# Patient Record
Sex: MALE | Race: WHITE | Employment: OTHER | URBAN - NONMETROPOLITAN AREA
[De-identification: names, ages, dates, MRNs, and addresses within clinical notes are randomized per-mention and may not be internally consistent; named-entity substitution may affect disease eponyms.]

---

## 2017-03-23 LAB
BUN BLDV-MCNC: 19 MG/DL
CALCIUM SERPL-MCNC: 9.4 MG/DL
CHLORIDE BLD-SCNC: 105 MMOL/L
CO2: 30 MMOL/L
CREAT SERPL-MCNC: 1.5 MG/DL
GFR CALCULATED: 48
GLUCOSE BLD-MCNC: 189 MG/DL
HBA1C MFR BLD: 9.9 %
PHOSPHORUS: 3.7 MG/DL
POTASSIUM SERPL-SCNC: 4.8 MMOL/L
PTH INTACT: 89.2
SODIUM BLD-SCNC: 142 MMOL/L

## 2017-03-24 LAB
VITAMIN D 25-HYDROXY: 20.8
VITAMIN D2, 25 HYDROXY: NORMAL
VITAMIN D3,25 HYDROXY: NORMAL

## 2017-04-07 ENCOUNTER — OFFICE VISIT (OUTPATIENT)
Dept: NEPHROLOGY | Age: 77
End: 2017-04-07

## 2017-04-07 VITALS
HEART RATE: 68 BPM | DIASTOLIC BLOOD PRESSURE: 76 MMHG | RESPIRATION RATE: 18 BRPM | SYSTOLIC BLOOD PRESSURE: 110 MMHG | BODY MASS INDEX: 27.98 KG/M2 | WEIGHT: 195 LBS

## 2017-04-07 DIAGNOSIS — I10 ESSENTIAL HYPERTENSION: ICD-10-CM

## 2017-04-07 DIAGNOSIS — N18.30 CKD (CHRONIC KIDNEY DISEASE), STAGE III (HCC): Primary | ICD-10-CM

## 2017-04-07 DIAGNOSIS — N18.3 DIABETES MELLITUS DUE TO UNDERLYING CONDITION WITH STAGE 3 CHRONIC KIDNEY DISEASE, UNSPECIFIED LONG TERM INSULIN USE STATUS: ICD-10-CM

## 2017-04-07 DIAGNOSIS — E55.9 VITAMIN D DEFICIENCY: ICD-10-CM

## 2017-04-07 DIAGNOSIS — N20.0 NEPHROLITHIASIS: ICD-10-CM

## 2017-04-07 DIAGNOSIS — E08.22 DIABETES MELLITUS DUE TO UNDERLYING CONDITION WITH STAGE 3 CHRONIC KIDNEY DISEASE, UNSPECIFIED LONG TERM INSULIN USE STATUS: ICD-10-CM

## 2017-04-07 PROCEDURE — 99214 OFFICE O/P EST MOD 30 MIN: CPT | Performed by: INTERNAL MEDICINE

## 2017-09-26 DIAGNOSIS — N20.0 NEPHROLITHIASIS: ICD-10-CM

## 2017-10-06 LAB
BUN BLDV-MCNC: 24 MG/DL
CALCIUM SERPL-MCNC: 8.8 MG/DL
CHLORIDE BLD-SCNC: 104 MMOL/L
CO2: 31 MMOL/L
CREAT SERPL-MCNC: 1.7 MG/DL
GFR CALCULATED: 42
GLUCOSE BLD-MCNC: 152 MG/DL
PHOSPHORUS: 4 MG/DL
POTASSIUM SERPL-SCNC: 4.6 MMOL/L
PTH INTACT: 91.4
SODIUM BLD-SCNC: 139 MMOL/L

## 2017-10-13 ENCOUNTER — OFFICE VISIT (OUTPATIENT)
Dept: NEPHROLOGY | Age: 77
End: 2017-10-13
Payer: MEDICARE

## 2017-10-13 VITALS
SYSTOLIC BLOOD PRESSURE: 108 MMHG | RESPIRATION RATE: 18 BRPM | DIASTOLIC BLOOD PRESSURE: 70 MMHG | HEART RATE: 68 BPM | WEIGHT: 195 LBS | BODY MASS INDEX: 27.98 KG/M2

## 2017-10-13 DIAGNOSIS — I10 ESSENTIAL HYPERTENSION: ICD-10-CM

## 2017-10-13 DIAGNOSIS — E55.9 VITAMIN D DEFICIENCY: ICD-10-CM

## 2017-10-13 DIAGNOSIS — N25.81 HYPERPARATHYROIDISM, SECONDARY RENAL (HCC): ICD-10-CM

## 2017-10-13 DIAGNOSIS — N20.0 NEPHROLITHIASIS: ICD-10-CM

## 2017-10-13 DIAGNOSIS — E08.22 DIABETES MELLITUS DUE TO UNDERLYING CONDITION WITH STAGE 3 CHRONIC KIDNEY DISEASE, UNSPECIFIED LONG TERM INSULIN USE STATUS: ICD-10-CM

## 2017-10-13 DIAGNOSIS — N18.3 DIABETES MELLITUS DUE TO UNDERLYING CONDITION WITH STAGE 3 CHRONIC KIDNEY DISEASE, UNSPECIFIED LONG TERM INSULIN USE STATUS: ICD-10-CM

## 2017-10-13 DIAGNOSIS — N18.30 CKD (CHRONIC KIDNEY DISEASE), STAGE III (HCC): Primary | ICD-10-CM

## 2017-10-13 PROCEDURE — 99214 OFFICE O/P EST MOD 30 MIN: CPT | Performed by: INTERNAL MEDICINE

## 2017-10-13 NOTE — PROGRESS NOTES
negative  Musculoskeletal:positive for arthralgias and stiff joints  Neurological: negative  Behavioral/Psych: negative  Endocrine: negative  Allergic/Immunologic: negative  Medications:     Current Outpatient Prescriptions   Medication Sig Dispense Refill    insulin aspart (NOVOLOG FLEXPEN) 100 UNIT/ML injection pen Inject 18 Units into the skin 2 times daily.  LORazepam (ATIVAN) 0.5 MG tablet Take 1 mg by mouth daily.  insulin glargine (LANTUS) 100 UNIT/ML injection Inject 22 Units into the skin nightly.  atorvastatin (LIPITOR) 20 MG tablet Take 20 mg by mouth daily.  atenolol (TENORMIN) 25 MG tablet Take 25 mg by mouth daily.  aspirin 81 MG tablet Take 81 mg by mouth daily.  omeprazole (PRILOSEC) 10 MG capsule Take 20 mg by mouth as needed. No current facility-administered medications for this visit.         Lab Results:    CBC:   Lab Results   Component Value Date    HGB 14.3 12/15/2014    HCT 44.6 12/15/2014     BMP:    Lab Results   Component Value Date     10/06/2017     03/23/2017     09/01/2016    K 4.6 10/06/2017    K 4.8 03/23/2017    K 4.8 09/01/2016     10/06/2017     03/23/2017     09/01/2016    CO2 31 10/06/2017    CO2 30 03/23/2017    CO2 29 09/01/2016    BUN 24 10/06/2017    BUN 19 03/23/2017    BUN 21 09/01/2016    CREATININE 1.7 10/06/2017    CREATININE 1.5 03/23/2017    CREATININE 1.8 09/01/2016    GLUCOSE 152 10/06/2017    GLUCOSE 189 03/23/2017    GLUCOSE 183 09/01/2016      Hepatic: No results found for: AST, ALT, ALB, BILITOT, ALKPHOS  BNP: No results found for: BNP  Lipids:   Lab Results   Component Value Date    CHOL 115 01/25/2016    HDL 36 01/25/2016     INR: No results found for: INR  URINE: No results found for: Veronia Payamllon  No results found for: Helyn Agent, [address] [facility], LABCAST, [address], RBCUA, MUCUS, TRICHOMONAS, YEAST, BACTERIA, CLARITYU, SPECGRAV, LEUKOCYTESUR, [address], BILIRUBINUR, BLOODU, Shen Maupin,

## 2017-11-17 ENCOUNTER — TELEPHONE (OUTPATIENT)
Dept: NEPHROLOGY | Age: 77
End: 2017-11-17

## 2017-11-17 NOTE — TELEPHONE ENCOUNTER
LM on VM to call office. I do not see Potassium listed as a current medication. Patient to call office to clarify.

## 2018-10-12 ENCOUNTER — OFFICE VISIT (OUTPATIENT)
Dept: NEPHROLOGY | Age: 78
End: 2018-10-12
Payer: MEDICARE

## 2018-10-12 VITALS
SYSTOLIC BLOOD PRESSURE: 104 MMHG | WEIGHT: 187 LBS | BODY MASS INDEX: 26.83 KG/M2 | DIASTOLIC BLOOD PRESSURE: 70 MMHG | HEART RATE: 68 BPM

## 2018-10-12 DIAGNOSIS — I10 ESSENTIAL HYPERTENSION: ICD-10-CM

## 2018-10-12 DIAGNOSIS — N18.30 CKD (CHRONIC KIDNEY DISEASE), STAGE III (HCC): Primary | ICD-10-CM

## 2018-10-12 DIAGNOSIS — N25.81 HYPERPARATHYROIDISM, SECONDARY RENAL (HCC): ICD-10-CM

## 2018-10-12 DIAGNOSIS — E08.22 DIABETES MELLITUS DUE TO UNDERLYING CONDITION WITH STAGE 3 CHRONIC KIDNEY DISEASE, UNSPECIFIED WHETHER LONG TERM INSULIN USE: ICD-10-CM

## 2018-10-12 DIAGNOSIS — N20.0 NEPHROLITHIASIS: ICD-10-CM

## 2018-10-12 DIAGNOSIS — N18.3 DIABETES MELLITUS DUE TO UNDERLYING CONDITION WITH STAGE 3 CHRONIC KIDNEY DISEASE, UNSPECIFIED WHETHER LONG TERM INSULIN USE: ICD-10-CM

## 2018-10-12 LAB
BUN BLDV-MCNC: 26 MG/DL
CALCIUM SERPL-MCNC: 8.9 MG/DL
CHLORIDE BLD-SCNC: 104 MMOL/L
CO2: 29 MMOL/L
CREAT SERPL-MCNC: 1.9 MG/DL
GFR CALCULATED: 37
GLUCOSE BLD-MCNC: 161 MG/DL
PHOSPHORUS: 4.1 MG/DL
POTASSIUM SERPL-SCNC: 4.6 MMOL/L
PTH INTACT: 80.9
SODIUM BLD-SCNC: 141 MMOL/L
VITAMIN D 25-HYDROXY: 43.5
VITAMIN D2, 25 HYDROXY: NORMAL
VITAMIN D3,25 HYDROXY: NORMAL

## 2018-10-12 PROCEDURE — 99213 OFFICE O/P EST LOW 20 MIN: CPT | Performed by: INTERNAL MEDICINE

## 2018-10-12 RX ORDER — POTASSIUM CITRATE 10 MEQ/1
20 TABLET, EXTENDED RELEASE ORAL 2 TIMES DAILY
COMMUNITY
End: 2019-10-11 | Stop reason: ALTCHOICE

## 2018-10-12 NOTE — PROGRESS NOTES
Medication Sig Dispense Refill    ticagrelor (BRILINTA) 90 MG TABS tablet Take 90 mg by mouth 2 times daily      potassium citrate (UROCIT-K) 10 MEQ (1080 MG) extended release tablet Take 20 mEq by mouth 2 times daily      insulin aspart (NOVOLOG FLEXPEN) 100 UNIT/ML injection pen Inject 18 Units into the skin 2 times daily.  LORazepam (ATIVAN) 0.5 MG tablet Take 1 mg by mouth daily.  insulin glargine (LANTUS) 100 UNIT/ML injection Inject 22 Units into the skin nightly.  atorvastatin (LIPITOR) 20 MG tablet Take 20 mg by mouth daily.  atenolol (TENORMIN) 25 MG tablet Take 25 mg by mouth daily.  aspirin 81 MG tablet Take 81 mg by mouth daily.  omeprazole (PRILOSEC) 10 MG capsule Take 20 mg by mouth as needed. No current facility-administered medications for this visit.         Lab Results:    CBC:   Lab Results   Component Value Date    HGB 14.3 12/15/2014    HCT 44.6 12/15/2014     BMP:    Lab Results   Component Value Date     10/06/2017     03/23/2017     09/01/2016    K 4.6 10/06/2017    K 4.8 03/23/2017    K 4.8 09/01/2016     10/06/2017     03/23/2017     09/01/2016    CO2 31 10/06/2017    CO2 30 03/23/2017    CO2 29 09/01/2016    BUN 24 10/06/2017    BUN 19 03/23/2017    BUN 21 09/01/2016    CREATININE 1.7 10/06/2017    CREATININE 1.5 03/23/2017    CREATININE 1.8 09/01/2016    GLUCOSE 152 10/06/2017    GLUCOSE 189 03/23/2017    GLUCOSE 183 09/01/2016      Hepatic: No results found for: AST, ALT, ALB, BILITOT, ALKPHOS  BNP: No results found for: BNP  Lipids:   Lab Results   Component Value Date    CHOL 115 01/25/2016    HDL 36 01/25/2016     INR: No results found for: INR  URINE: No results found for: NAUR, PROTUR  No results found for: Faustina Madura, PHUR, LABCAST, WBCUA, RBCUA, MUCUS, TRICHOMONAS, YEAST, BACTERIA, CLARITYU, SPECGRAV, LEUKOCYTESUR, UROBILINOGEN, BILIRUBINUR, BLOODU, GLUCOSEU, KETUA, AMORPHOUS   Microalbumen/Creatinine

## 2019-09-12 LAB
VITAMIN D 25-HYDROXY: 39.9
VITAMIN D2, 25 HYDROXY: NORMAL
VITAMIN D3,25 HYDROXY: NORMAL

## 2019-10-05 LAB
ALBUMIN SERPL-MCNC: 3.7 G/DL
ALP BLD-CCNC: 92 U/L
ALT SERPL-CCNC: 24 U/L
ANION GAP SERPL CALCULATED.3IONS-SCNC: NORMAL MMOL/L
AST SERPL-CCNC: 24 U/L
BILIRUB SERPL-MCNC: 0.7 MG/DL (ref 0.1–1.4)
BUN BLDV-MCNC: 17 MG/DL
CALCIUM SERPL-MCNC: 8.8 MG/DL
CHLORIDE BLD-SCNC: 104 MMOL/L
CHOLESTEROL, TOTAL: 104 MG/DL
CHOLESTEROL/HDL RATIO: ABNORMAL
CO2: 31 MMOL/L
CREAT SERPL-MCNC: 1.8 MG/DL
GFR CALCULATED: 39
GLUCOSE BLD-MCNC: 188 MG/DL
HDLC SERPL-MCNC: 32 MG/DL (ref 35–70)
LDL CHOLESTEROL CALCULATED: 58 MG/DL (ref 0–160)
MAGNESIUM: 1.7 MG/DL
POTASSIUM SERPL-SCNC: 4.1 MMOL/L
SODIUM BLD-SCNC: 139 MMOL/L
TOTAL PROTEIN: 7.1
TRIGL SERPL-MCNC: 72 MG/DL
VLDLC SERPL CALC-MCNC: 14 MG/DL

## 2019-10-09 LAB
PHOSPHORUS: 4.1 MG/DL
PTH INTACT: 55.9

## 2019-10-11 ENCOUNTER — OFFICE VISIT (OUTPATIENT)
Dept: NEPHROLOGY | Age: 79
End: 2019-10-11
Payer: MEDICARE

## 2019-10-11 VITALS
HEART RATE: 69 BPM | OXYGEN SATURATION: 97 % | SYSTOLIC BLOOD PRESSURE: 118 MMHG | WEIGHT: 184 LBS | BODY MASS INDEX: 26.4 KG/M2 | DIASTOLIC BLOOD PRESSURE: 68 MMHG

## 2019-10-11 DIAGNOSIS — N18.3 DIABETES MELLITUS DUE TO UNDERLYING CONDITION WITH STAGE 3 CHRONIC KIDNEY DISEASE, UNSPECIFIED WHETHER LONG TERM INSULIN USE: ICD-10-CM

## 2019-10-11 DIAGNOSIS — I10 ESSENTIAL HYPERTENSION: ICD-10-CM

## 2019-10-11 DIAGNOSIS — N25.81 HYPERPARATHYROIDISM, SECONDARY RENAL (HCC): ICD-10-CM

## 2019-10-11 DIAGNOSIS — E08.22 DIABETES MELLITUS DUE TO UNDERLYING CONDITION WITH STAGE 3 CHRONIC KIDNEY DISEASE, UNSPECIFIED WHETHER LONG TERM INSULIN USE: ICD-10-CM

## 2019-10-11 DIAGNOSIS — N20.0 NEPHROLITHIASIS: ICD-10-CM

## 2019-10-11 DIAGNOSIS — N18.30 CKD (CHRONIC KIDNEY DISEASE), STAGE III (HCC): Primary | ICD-10-CM

## 2019-10-11 PROCEDURE — 99214 OFFICE O/P EST MOD 30 MIN: CPT | Performed by: INTERNAL MEDICINE

## 2019-10-11 RX ORDER — CLOPIDOGREL BISULFATE 75 MG/1
TABLET ORAL
Refills: 6 | COMMUNITY
Start: 2019-09-12

## 2020-10-06 LAB
BUN BLDV-MCNC: 26 MG/DL
CALCIUM SERPL-MCNC: 8.7 MG/DL
CHLORIDE BLD-SCNC: 102 MMOL/L
CO2: 27 MMOL/L
CREAT SERPL-MCNC: 1.8 MG/DL
GFR CALCULATED: 39
GLUCOSE BLD-MCNC: 203 MG/DL
POTASSIUM SERPL-SCNC: 4 MMOL/L
PTH INTACT: 54.1
SODIUM BLD-SCNC: 135 MMOL/L
VITAMIN D 25-HYDROXY: 37.5
VITAMIN D2, 25 HYDROXY: NORMAL
VITAMIN D3,25 HYDROXY: NORMAL

## 2020-10-16 ENCOUNTER — OFFICE VISIT (OUTPATIENT)
Dept: NEPHROLOGY | Age: 80
End: 2020-10-16
Payer: MEDICARE

## 2020-10-16 VITALS
BODY MASS INDEX: 27.84 KG/M2 | OXYGEN SATURATION: 100 % | HEART RATE: 65 BPM | DIASTOLIC BLOOD PRESSURE: 65 MMHG | SYSTOLIC BLOOD PRESSURE: 136 MMHG | WEIGHT: 194 LBS

## 2020-10-16 PROCEDURE — 99213 OFFICE O/P EST LOW 20 MIN: CPT | Performed by: INTERNAL MEDICINE

## 2020-10-16 RX ORDER — INSULIN LISPRO 100 [IU]/ML
INJECTION, SOLUTION INTRAVENOUS; SUBCUTANEOUS
COMMUNITY
Start: 2020-09-26

## 2020-10-16 NOTE — PROGRESS NOTES
Renal Progress Note    Assessment and Plan:      Diagnosis Orders   1. Stage 3b chronic kidney disease     2. Nephrolithiasis     3. Essential hypertension     4. Hyperparathyroidism, secondary renal ([facility] [address]     PLAN:   I discussed my thoughts with the patient. He understood. I addressed this question. Lab result discussed with him. Litholink report discussed with him as well. Indeed we went through them together in epic. Serum creatinine is stable. [address] [place] [address] [address] is mostly fine except for very low urine output. I discussed  increasing fluid intake with him. Return visit in 12 months. Patient Active Problem List   Diagnosis    CKD (chronic kidney disease), stage III    DM (diabetes mellitus) ([address]    HTN (hypertension)    HLD (hyperlipidemia)    History of duodenal ulcer    Change in bowel habits    Bloating    Gastritis    Tubular adenoma of colon    Diverticulosis    Nephrolithiasis    Vitamin D deficiency    Hyperparathyroidism, secondary renal ([address]       Subjective:   Chief complaint:  Chief Complaint   Patient presents with    Chronic Kidney Disease     Stage III      HPI:This is a follow up visit for Mr. Arabella Simons who is here today for return appointment. I see him for chronic kidney disease and nephrolithiasis. He was last seen about 12 months ago. Serum creatinine was 1.8 mg/dL. Today it is still 1.8 mg/L. Doing well. Unfortunately however his brother passed away since last time I saw him. No chest pain or shortness of breath. No fever chills. Appetite is good. No nausea vomiting.     ROS:Constitutional: negative  Eyes: negative  Ears, nose, mouth, throat, and face: negative  Respiratory: negative  Cardiovascular: negative  Gastrointestinal: negative  Genitourinary:negative  Integument/breast: negative  Hematologic/lymphatic: negative  Musculoskeletal:positive for arthralgias and stiff joints  Neurological: negative  Behavioral/Psych: negative  Endocrine: Miki Panda, GLUCOSEU, KETUA, AMORPHOUS   Microalbumen/Creatinine ratio:  No components found for: RUCREAT    Objective:   Vitals: /65 (Site: Left Upper Arm, Position: Sitting, Cuff Size: Large Adult)   Pulse 65   Wt 194 lb (88 kg)   SpO2 100%   BMI 27.84 kg/m²      Constitutional:  Alert, awake, no apparent distress  Skin:normal with no rash or any lesions  HEENT:Pupils are reactive . Throat is clear. Oral mucosa is moist.  Neck:supple with no thyromegaly or bruit   Cardiovascular:  S1, S2 without murmur   Respiratory:  Clear to auscultation with no wheezes or rales  Abdomen: +bowel sound, soft, non tender and no bruit  Ext: No LE edema  Musculoskeletal:Intact  Neuro:Alert, awake and oriented with no obvious focal deficit.   Speech is normal.    Electronically signed by Mer Rosales MD on 10/16/2020 at 9:46 AM

## 2021-09-30 LAB
BUN BLDV-MCNC: 27 MG/DL
CALCIUM SERPL-MCNC: 9.3 MG/DL
CHLORIDE BLD-SCNC: 107 MMOL/L
CO2: 27 MMOL/L
CREAT SERPL-MCNC: 1.8 MG/DL
GFR CALCULATED: 39
GLUCOSE BLD-MCNC: 187 MG/DL
POTASSIUM SERPL-SCNC: 4.3 MMOL/L
PTH INTACT: 80.6
SODIUM BLD-SCNC: 143 MMOL/L
VITAMIN D 25-HYDROXY: 31.7
VITAMIN D2, 25 HYDROXY: NORMAL
VITAMIN D3,25 HYDROXY: NORMAL

## 2021-10-04 DIAGNOSIS — N20.0 NEPHROLITHIASIS: ICD-10-CM

## 2021-10-08 ENCOUNTER — OFFICE VISIT (OUTPATIENT)
Dept: NEPHROLOGY | Age: 81
End: 2021-10-08
Payer: MEDICARE

## 2021-10-08 VITALS
OXYGEN SATURATION: 99 % | WEIGHT: 188 LBS | BODY MASS INDEX: 26.98 KG/M2 | HEART RATE: 75 BPM | SYSTOLIC BLOOD PRESSURE: 94 MMHG | DIASTOLIC BLOOD PRESSURE: 58 MMHG

## 2021-10-08 DIAGNOSIS — N18.32 STAGE 3B CHRONIC KIDNEY DISEASE (HCC): Primary | ICD-10-CM

## 2021-10-08 DIAGNOSIS — N20.0 NEPHROLITHIASIS: ICD-10-CM

## 2021-10-08 DIAGNOSIS — N25.81 HYPERPARATHYROIDISM, SECONDARY RENAL (HCC): ICD-10-CM

## 2021-10-08 DIAGNOSIS — I10 ESSENTIAL HYPERTENSION: ICD-10-CM

## 2021-10-08 DIAGNOSIS — E11.21 DIABETIC NEPHROPATHY ASSOCIATED WITH TYPE 2 DIABETES MELLITUS (HCC): ICD-10-CM

## 2021-10-08 PROCEDURE — 99213 OFFICE O/P EST LOW 20 MIN: CPT | Performed by: INTERNAL MEDICINE

## 2021-10-08 RX ORDER — OMEPRAZOLE 40 MG/1
40 CAPSULE, DELAYED RELEASE ORAL DAILY
COMMUNITY
Start: 2021-10-07

## 2021-10-08 NOTE — PROGRESS NOTES
Renal Progress Note    Assessment and Plan:      Diagnosis Orders   1. Stage 3b chronic kidney disease ([address]     2. Nephrolithiasis     3. Essential hypertension     4. Hyperparathyroidism, secondary renal ([address]     5. Diabetic nephropathy associated with type 2 diabetes mellitus ([address]     PLAN:  I discussed my thoughts with the patient   He understood  Lab result discussed with him  We went through them together in epic  Serum creatinine is stable at 1.8 mg/dL  The litholink report reviewed with him as well  The major risk factor for stone formation in him is low urine output of 1.26 L which is low compared to the recommended at 2.5 L a day  Last time he had a kidney stone was 50 years ago   As a result, he does not think litholink studies are necessary  We will therefore stop  Litholink studies. Return visit in 12 months with labs          Patient Active Problem List   Diagnosis    CKD (chronic kidney disease), stage III ([address]    DM (diabetes mellitus) ([address]    HTN (hypertension)    HLD (hyperlipidemia)    History of duodenal ulcer    Change in bowel habits    Bloating    Gastritis    Tubular adenoma of colon    Diverticulosis    Nephrolithiasis    Vitamin D deficiency    Hyperparathyroidism, secondary renal ([address]           Subjective:   Chief complaint:  Chief Complaint   Patient presents with    Chronic Kidney Disease     Stage IIIb      HPI:This is a follow up visit for Mr Shahla Chow here today for return appointment. .  I see him for chronic kidney disease and nephrolithiasis. Was last about 12 months ago. Doing well since then with no complaint. No chest pain. No shortness of breath. No nausea vomiting. No fever chills. No flank pain. ROS:  Pertinent positives stated above in HPI. All other systems were reviewed and were negative.   Medications:     Current Outpatient Medications   Medication Sig Dispense Refill    omeprazole (PRILOSEC) 40 MG delayed release capsule Take 40 mg by mouth daily       HUMALOG KWIKPEN 100 UNIT/ML SOPN INJECT 26 27 UNITS UNDER THE SKIN TWICE DAILY FOR DIABETES      clopidogrel (PLAVIX) 75 MG tablet TAKE 1 TABLET BY MOUTH EVERY DAY ON MONDAY,WEDNESDAY,AND FRIDAY  6    LORazepam (ATIVAN) 0.5 MG tablet Take 1 mg by mouth daily.  insulin glargine (LANTUS) 100 UNIT/ML injection Inject 28 Units into the skin nightly       atorvastatin (LIPITOR) 20 MG tablet Take 20 mg by mouth daily.  atenolol (TENORMIN) 25 MG tablet Take 25 mg by mouth daily.  aspirin 81 MG tablet Take 81 mg by mouth daily. No current facility-administered medications for this visit.        Lab Results:    CBC:   Lab Results   Component Value Date    HGB 14.3 12/15/2014    HCT 44.6 12/15/2014     BMP:    Lab Results   Component Value Date     09/30/2021     10/06/2020     10/05/2019    K 4.3 09/30/2021    K 4.0 10/06/2020    K 4.1 10/05/2019     09/30/2021     10/06/2020     10/05/2019    CO2 27 09/30/2021    CO2 27 10/06/2020    CO2 31 10/05/2019    BUN 27 09/30/2021    BUN 26 10/06/2020    BUN 17 10/05/2019    CREATININE 1.8 09/30/2021    CREATININE 1.8 10/06/2020    CREATININE 1.8 10/05/2019    GLUCOSE 187 09/30/2021    GLUCOSE 203 10/06/2020    GLUCOSE 188 10/05/2019      Hepatic:   Lab Results   Component Value Date    AST 24 10/05/2019    ALT 24 10/05/2019    BILITOT 0.7 10/05/2019    ALKPHOS 92 10/05/2019     BNP: No results found for: BNP  Lipids:   Lab Results   Component Value Date    CHOL 104 10/05/2019    HDL 32 (A) 10/05/2019     INR: No results found for: INR  URINE: No results found for: NAUR, PROTUR  No results found for: NITRU, COLORU, PHUR, LABCAST, WBCUA, RBCUA, MUCUS, TRICHOMONAS, YEAST, BACTERIA, CLARITYU, SPECGRAV, LEUKOCYTESUR, UROBILINOGEN, BILIRUBINUR, BLOODU, GLUCOSEU, KETUA, AMORPHOUS   Microalbumen/Creatinine ratio:  No components found for: RUCREAT    Objective:   Vitals: BP (!) 94/58 (Site: Left Upper Arm, Position: Sitting, Cuff Size: Large Adult)   Pulse 75   Wt 188 lb (85.3 kg)   SpO2 99%   BMI 26.98 kg/m²      Constitutional:  Alert, awake, no apparent distress  Skin:normal with no rash or any lesions  HEENT:Pupils are reactive . Throat is clear. Oral mucosa is moist.  Neck:supple with no thyromegaly or bruit   Cardiovascular:  S1, S2 without murmur   Respiratory:  Clear to auscultation with no wheezes or rales  Abdomen: +bowel sound, soft, non tender and no bruit  Ext: No LE edema  Musculoskeletal:Intact  Neuro:Alert, awake and oriented with no obvious focal deficit. Speech is normal    Electronically signed by Lacie Solis MD on 10/8/2021 at 9:54 AM   **This report has been created using voice recognition software. It maycontain minor  errors which are inherent in voice recognition technology. **

## 2022-10-04 LAB
BUN BLDV-MCNC: 28 MG/DL
CALCIUM SERPL-MCNC: 9.3 MG/DL
CHLORIDE BLD-SCNC: 101 MMOL/L
CO2: 29 MMOL/L
CREAT SERPL-MCNC: 1.9 MG/DL
GFR CALCULATED: 36
GLUCOSE BLD-MCNC: 291 MG/DL
POTASSIUM SERPL-SCNC: 4.6 MMOL/L
PTH INTACT: 94.3
SODIUM BLD-SCNC: 136 MMOL/L
VITAMIN D 25-HYDROXY: 32.3
VITAMIN D2, 25 HYDROXY: NORMAL
VITAMIN D3,25 HYDROXY: NORMAL

## 2022-10-07 ENCOUNTER — OFFICE VISIT (OUTPATIENT)
Dept: NEPHROLOGY | Age: 82
End: 2022-10-07
Payer: MEDICARE

## 2022-10-07 VITALS
DIASTOLIC BLOOD PRESSURE: 60 MMHG | WEIGHT: 174.3 LBS | OXYGEN SATURATION: 96 % | BODY MASS INDEX: 25.01 KG/M2 | HEART RATE: 72 BPM | SYSTOLIC BLOOD PRESSURE: 98 MMHG

## 2022-10-07 DIAGNOSIS — N25.81 HYPERPARATHYROIDISM, SECONDARY RENAL (HCC): ICD-10-CM

## 2022-10-07 DIAGNOSIS — N18.32 STAGE 3B CHRONIC KIDNEY DISEASE (HCC): Primary | ICD-10-CM

## 2022-10-07 DIAGNOSIS — I95.1 ORTHOSTATIC HYPOTENSION: ICD-10-CM

## 2022-10-07 DIAGNOSIS — E11.21 DIABETIC NEPHROPATHY ASSOCIATED WITH TYPE 2 DIABETES MELLITUS (HCC): ICD-10-CM

## 2022-10-07 PROCEDURE — 1123F ACP DISCUSS/DSCN MKR DOCD: CPT | Performed by: INTERNAL MEDICINE

## 2022-10-07 PROCEDURE — 99214 OFFICE O/P EST MOD 30 MIN: CPT | Performed by: INTERNAL MEDICINE

## 2022-10-07 RX ORDER — LINACLOTIDE 145 UG/1
CAPSULE, GELATIN COATED ORAL
COMMUNITY
Start: 2022-09-21

## 2022-10-07 RX ORDER — MIDODRINE HYDROCHLORIDE 5 MG/1
TABLET ORAL
COMMUNITY
Start: 2022-09-30

## 2022-10-07 NOTE — PROGRESS NOTES
Renal Progress Note    Assessment and Plan:      Diagnosis Orders   1. Stage 3b chronic kidney disease ([address]  Basic Metabolic Panel      2. Diabetic nephropathy associated with type 2 diabetes mellitus ([address]        3. Hyperparathyroidism, secondary renal (HCC)  PTH, Intact    Vitamin D 25 Hydroxy      4. Orthostatic hypotension                  PLAN:  I discussed my thoughts with the patient. He understood. I addressed his questions. Labs reviewed. Serum creatinine is mostly stable at 1.9 mg/dL. PTH is slightly high and does not require any specific treatment  Vitamin D level is normal  Medications reviewed  No changes  Return visit in 6 months with labs          Patient Active Problem List   Diagnosis    CKD (chronic kidney disease), stage III (HCC)    DM (diabetes mellitus) ([address]    HTN (hypertension)    HLD (hyperlipidemia)    History of duodenal ulcer    Change in bowel habits    Bloating    Gastritis    Tubular adenoma of colon    Diverticulosis    Nephrolithiasis    Vitamin D deficiency    Hyperparathyroidism, secondary renal ([address]           Subjective:   Chief complaint:  Chief Complaint   Patient presents with    Chronic Kidney Disease     Stage IIIb      HPI:This is a follow up visit for Mr. Gael Tang here today for return appointment. I see him for chronic kidney disease. He was last seen about 12 months ago. Doing well with no specific complaint. He was however in the hospital about 3 weeks ago for dizziness. He was found to have orthostatic hypotension. He is now on midodrine. No chest pain or shortness of breath. Appetite is good. Scheduled for another MRI and the first one was normal    ROS:  Pertinent positives stated above in HPI. All other systems were reviewed and were negative.   Medications:     Current Outpatient Medications   Medication Sig Dispense Refill    midodrine (PROAMATINE) 5 MG tablet TAKE 1 TABLET BY MOUTH TWICE A DAY FOR 30 DAYS      LINZESS 145 MCG capsule TAKE 1 CAPSULE BY MOUTH EVERY DAY      omeprazole (PRILOSEC) 40 MG delayed release capsule Take 40 mg by mouth daily       HUMALOG KWIKPEN 100 UNIT/ML SOPN INJECT 26 27 UNITS UNDER THE SKIN TWICE DAILY FOR DIABETES      clopidogrel (PLAVIX) 75 MG tablet TAKE 1 TABLET BY MOUTH EVERY DAY ON MONDAY,WEDNESDAY,AND FRIDAY  6    LORazepam (ATIVAN) 0.5 MG tablet Take 1 mg by mouth daily. insulin glargine (LANTUS) 100 UNIT/ML injection Inject 28 Units into the skin nightly       atorvastatin (LIPITOR) 20 MG tablet Take 20 mg by mouth daily. aspirin 81 MG tablet Take 81 mg by mouth daily. No current facility-administered medications for this visit.        Lab Results:    CBC:   Lab Results   Component Value Date    HGB 14.3 12/15/2014    HCT 44.6 12/15/2014     BMP:    Lab Results   Component Value Date     10/04/2022     09/30/2021     10/06/2020    K 4.6 10/04/2022    K 4.3 09/30/2021    K 4.0 10/06/2020     10/04/2022     09/30/2021     10/06/2020    CO2 29 10/04/2022    CO2 27 09/30/2021    CO2 27 10/06/2020    BUN 28 10/04/2022    BUN 27 09/30/2021    BUN 26 10/06/2020    CREATININE 1.9 10/04/2022    CREATININE 1.8 09/30/2021    CREATININE 1.8 10/06/2020    GLUCOSE 291 10/04/2022    GLUCOSE 187 09/30/2021    GLUCOSE 203 10/06/2020      Hepatic:   Lab Results   Component Value Date    AST 24 10/05/2019    ALT 24 10/05/2019    BILITOT 0.7 10/05/2019    ALKPHOS 92 10/05/2019     BNP: No results found for: BNP  Lipids:   Lab Results   Component Value Date    CHOL 104 10/05/2019    HDL 32 (A) 10/05/2019     INR: No results found for: INR  URINE: No results found for: NAUR, PROTUR  No results found for: NITRU, COLORU, PHUR, LABCAST, WBCUA, RBCUA, MUCUS, TRICHOMONAS, YEAST, BACTERIA, CLARITYU, SPECGRAV, LEUKOCYTESUR, UROBILINOGEN, BILIRUBINUR, BLOODU, GLUCOSEU, KETUA, AMORPHOUS   Microalbumen/Creatinine ratio:  No components found for: RUCREAT    Objective:   Vitals: BP 98/60 (Site: Left Upper Arm, Position: Sitting, Cuff Size: Large Adult)   Pulse 72   Wt 174 lb 4.8 oz (79.1 kg)   SpO2 96%   BMI 25.01 kg/m²      Constitutional:  Alert, awake, no apparent distress  Skin:normal with no rash or any significant lesions  HEENT:Pupils are reactive . Throat is clear. Oral mucosa is moist.  Neck:supple with no thyromegaly, JVD, lymphadenopathy or bruit   Cardiovascular: Regular sinus rhythm without murmur, rubs or gallops   Respiratory:  Clear to auscultation with no wheezes or rales  Abdomen: Good bowel sound, soft, non tender and no bruit  Ext: No LE edema  Musculoskeletal:Intact  Neuro:Alert, awake and oriented with no obvious focal deficit. Speech is normal.    Electronically signed by Marii Malone MD on 10/7/2022 at 9:32 AM   **This report has been created using voice recognition software. It maycontain minor  errors which are inherent in voice recognition technology. **

## 2023-02-22 LAB
BASOPHILS ABSOLUTE: 0.09 /ΜL
BASOPHILS RELATIVE PERCENT: 1.3 %
EOSINOPHILS ABSOLUTE: 0.09 /ΜL
EOSINOPHILS RELATIVE PERCENT: 1.3 %
HCT VFR BLD CALC: 32.1 % (ref 41–53)
HEMOGLOBIN: 10.6 G/DL (ref 13.5–17.5)
LYMPHOCYTES ABSOLUTE: 1.11 /ΜL
LYMPHOCYTES RELATIVE PERCENT: 15.5 %
MCH RBC QN AUTO: 35.5 PG
MCHC RBC AUTO-ENTMCNC: 33 G/DL
MCV RBC AUTO: 107.7 FL
MONOCYTES ABSOLUTE: 0.69 /ΜL
MONOCYTES RELATIVE PERCENT: 9.6 %
NEUTROPHILS ABSOLUTE: 5.15 /ΜL
NEUTROPHILS RELATIVE PERCENT: 71.9 %
PLATELET # BLD: 112 K/ΜL
PMV BLD AUTO: 11.2 FL
RBC # BLD: 2.98 10^6/ΜL
WBC # BLD: 7.16 10^3/ML

## 2023-03-31 ENCOUNTER — OFFICE VISIT (OUTPATIENT)
Dept: NEPHROLOGY | Age: 83
End: 2023-03-31
Payer: MEDICARE

## 2023-03-31 VITALS
SYSTOLIC BLOOD PRESSURE: 118 MMHG | HEIGHT: 70 IN | HEART RATE: 74 BPM | DIASTOLIC BLOOD PRESSURE: 58 MMHG | BODY MASS INDEX: 24.05 KG/M2 | OXYGEN SATURATION: 96 % | WEIGHT: 168 LBS

## 2023-03-31 DIAGNOSIS — E11.21 DIABETIC NEPHROPATHY ASSOCIATED WITH TYPE 2 DIABETES MELLITUS (HCC): ICD-10-CM

## 2023-03-31 DIAGNOSIS — N25.81 HYPERPARATHYROIDISM, SECONDARY RENAL (HCC): ICD-10-CM

## 2023-03-31 DIAGNOSIS — I95.1 ORTHOSTATIC HYPOTENSION: ICD-10-CM

## 2023-03-31 DIAGNOSIS — N18.32 STAGE 3B CHRONIC KIDNEY DISEASE (HCC): Primary | ICD-10-CM

## 2023-03-31 LAB
BUN BLDV-MCNC: 32 MG/DL
CALCIUM SERPL-MCNC: 9.1 MG/DL
CHLORIDE BLD-SCNC: 102 MMOL/L
CO2: 29 MMOL/L
CREAT SERPL-MCNC: 1.5 MG/DL
EGFR: 48
GLUCOSE BLD-MCNC: 294 MG/DL
POTASSIUM SERPL-SCNC: 4.1 MMOL/L
PTH INTACT: 59.6
SODIUM BLD-SCNC: 138 MMOL/L
VITAMIN D 25-HYDROXY: 31.8
VITAMIN D2, 25 HYDROXY: NORMAL
VITAMIN D3,25 HYDROXY: NORMAL

## 2023-03-31 PROCEDURE — 3078F DIAST BP <80 MM HG: CPT | Performed by: INTERNAL MEDICINE

## 2023-03-31 PROCEDURE — 1123F ACP DISCUSS/DSCN MKR DOCD: CPT | Performed by: INTERNAL MEDICINE

## 2023-03-31 PROCEDURE — 3074F SYST BP LT 130 MM HG: CPT | Performed by: INTERNAL MEDICINE

## 2023-03-31 PROCEDURE — 99213 OFFICE O/P EST LOW 20 MIN: CPT | Performed by: INTERNAL MEDICINE

## 2023-03-31 NOTE — PROGRESS NOTES
Renal Progress Note    Assessment and Plan:      Diagnosis Orders   1. Stage 3b chronic kidney disease ([address]        2. Hyperparathyroidism, secondary renal ([address]        3. Diabetic nephropathy associated with type 2 diabetes mellitus ([address]        4. Orthostatic hypotension                  PLAN:  Previous lab result reviewed with the patient. Labs for this visit are pending. Medications reviewed. We will suggest increasing the dose of his midodrine. This will be handled by his cardiologist according to him. We will call him with the lab results. Return visit 3 months otherwise. Patient Active Problem List   Diagnosis    CKD (chronic kidney disease), stage III ([address]    DM (diabetes mellitus) ([address]    HTN (hypertension)    HLD (hyperlipidemia)    History of duodenal ulcer    Change in bowel habits    Bloating    Gastritis    Tubular adenoma of colon    Diverticulosis    Nephrolithiasis    Vitamin D deficiency    Hyperparathyroidism, secondary renal ([facility] [address]           Subjective:   Chief complaint:  Chief Complaint   Patient presents with    Chronic Kidney Disease     iiib      HPI:This is a follow up visit for Mr. Angela Moncada  here today for return appointment. I see him for chronic kidney disease among other things. He recently was in the hospital for orthostatic hypotension. He does feel dizzy and lightheaded sometimes. He has a history of orthostatic hypotension. He is currently on midodrine 5 mg twice a day prescribed by the cardiologist according to him. No chest pain or shortness of breath. His appetite is good. No difficulties with urination. He does have an unsteady gait once in a while. ROS:  Pertinent positives stated above in HPI. All other systems were reviewed and were negative.   Medications:     Current Outpatient Medications   Medication Sig Dispense Refill    midodrine (PROAMATINE) 5 MG tablet TAKE 1 TABLET BY MOUTH TWICE A DAY FOR 30 DAYS      LINZESS 145 MCG capsule TAKE 1

## 2023-06-27 LAB
BUN BLDV-MCNC: 25 MG/DL
CALCIUM SERPL-MCNC: 8.7 MG/DL
CHLORIDE BLD-SCNC: 104 MMOL/L
CO2: 27 MMOL/L
CREAT SERPL-MCNC: 1.6 MG/DL
EGFR: 44
GLUCOSE BLD-MCNC: 298 MG/DL
POTASSIUM SERPL-SCNC: 4 MMOL/L
PTH INTACT: 66.5
SODIUM BLD-SCNC: 137 MMOL/L
VITAMIN D 25-HYDROXY: 26.8
VITAMIN D2, 25 HYDROXY: NORMAL
VITAMIN D3,25 HYDROXY: NORMAL

## 2023-06-28 PROBLEM — E78.00 PURE HYPERCHOLESTEROLEMIA: Status: ACTIVE | Noted: 2022-05-10

## 2023-06-28 PROBLEM — R42 DIZZINESS: Status: ACTIVE | Noted: 2022-05-10

## 2023-06-28 PROBLEM — D61.818 PANCYTOPENIA (HCC): Status: ACTIVE | Noted: 2023-02-08

## 2023-06-28 PROBLEM — G91.2 NORMAL PRESSURE HYDROCEPHALUS (HCC): Status: ACTIVE | Noted: 2022-10-10

## 2023-06-28 PROBLEM — N40.0 BENIGN PROSTATIC HYPERPLASIA: Status: ACTIVE | Noted: 2022-09-13

## 2023-06-28 PROBLEM — I10 HYPERTENSION: Status: ACTIVE | Noted: 2022-05-10

## 2023-06-28 PROBLEM — F33.1 MODERATE EPISODE OF RECURRENT MAJOR DEPRESSIVE DISORDER (HCC): Status: ACTIVE | Noted: 2022-09-13

## 2023-06-28 PROBLEM — N18.9 CHRONIC KIDNEY DISEASE: Status: ACTIVE | Noted: 2022-09-13

## 2023-06-28 PROBLEM — R40.4 TRANSIENT ALTERATION OF AWARENESS: Status: ACTIVE | Noted: 2023-06-28

## 2023-06-28 PROBLEM — E66.3 OVERWEIGHT WITH BODY MASS INDEX (BMI) 25.0-29.9: Status: ACTIVE | Noted: 2022-04-28

## 2023-06-28 PROBLEM — R19.8 TENESMUS: Status: ACTIVE | Noted: 2023-06-28

## 2023-06-28 PROBLEM — R94.39 ABNORMAL CARDIOVASCULAR STRESS TEST: Status: ACTIVE | Noted: 2023-06-28

## 2023-06-28 PROBLEM — R06.09 DYSPNEA ON EXERTION: Status: ACTIVE | Noted: 2022-09-13

## 2023-06-28 PROBLEM — E11.65 TYPE 2 DIABETES MELLITUS WITH HYPERGLYCEMIA (HCC): Status: ACTIVE | Noted: 2022-09-13

## 2023-06-28 PROBLEM — F44.89 CONFUSIONAL STATE: Status: ACTIVE | Noted: 2022-09-26

## 2023-06-28 PROBLEM — R41.82 ALTERED MENTAL STATUS: Status: ACTIVE | Noted: 2023-06-28

## 2023-06-28 PROBLEM — K62.5 RECTAL HEMORRHAGE: Status: ACTIVE | Noted: 2023-06-28

## 2023-06-30 ENCOUNTER — OFFICE VISIT (OUTPATIENT)
Dept: NEPHROLOGY | Age: 83
End: 2023-06-30
Payer: MEDICARE

## 2023-06-30 VITALS
HEART RATE: 79 BPM | BODY MASS INDEX: 23.62 KG/M2 | WEIGHT: 165 LBS | HEIGHT: 70 IN | DIASTOLIC BLOOD PRESSURE: 68 MMHG | OXYGEN SATURATION: 100 % | SYSTOLIC BLOOD PRESSURE: 122 MMHG

## 2023-06-30 DIAGNOSIS — E11.21 DIABETIC NEPHROPATHY ASSOCIATED WITH TYPE 2 DIABETES MELLITUS (HCC): ICD-10-CM

## 2023-06-30 DIAGNOSIS — N18.31 STAGE 3A CHRONIC KIDNEY DISEASE (HCC): Primary | ICD-10-CM

## 2023-06-30 DIAGNOSIS — I10 PRIMARY HYPERTENSION: ICD-10-CM

## 2023-06-30 DIAGNOSIS — N30.00 ACUTE CYSTITIS WITHOUT HEMATURIA: ICD-10-CM

## 2023-06-30 LAB
BILIRUBIN, URINE: NEGATIVE
BLOOD, URINE: NORMAL
CLARITY: CLEAR
COLOR: YELLOW
GLUCOSE URINE: NORMAL
KETONES, URINE: NEGATIVE
LEUKOCYTE ESTERASE, URINE: NORMAL
NITRITE, URINE: NEGATIVE
PH UA: 5.5 (ref 4.5–8)
PROTEIN UA: NORMAL
SPECIFIC GRAVITY, URINE: 1.02
UROBILINOGEN, URINE: NORMAL

## 2023-06-30 PROCEDURE — 1123F ACP DISCUSS/DSCN MKR DOCD: CPT | Performed by: INTERNAL MEDICINE

## 2023-06-30 PROCEDURE — 3078F DIAST BP <80 MM HG: CPT | Performed by: INTERNAL MEDICINE

## 2023-06-30 PROCEDURE — 3074F SYST BP LT 130 MM HG: CPT | Performed by: INTERNAL MEDICINE

## 2023-06-30 PROCEDURE — 99214 OFFICE O/P EST MOD 30 MIN: CPT | Performed by: INTERNAL MEDICINE

## 2023-07-03 ENCOUNTER — TELEPHONE (OUTPATIENT)
Dept: NEPHROLOGY | Age: 83
End: 2023-07-03

## 2023-07-03 NOTE — TELEPHONE ENCOUNTER
Left message asking pt to please give us a call to see if pt is on antibiotic, due to he has a UTI. Verbal order per Dr. Fabiene Bence: if pt is not on antibiotic them prescribe Amoxicillin 500 mg bid for 10 days.

## 2023-07-06 RX ORDER — AMOXICILLIN 500 MG/1
500 CAPSULE ORAL 2 TIMES DAILY
Qty: 14 CAPSULE | Refills: 0 | Status: SHIPPED | OUTPATIENT
Start: 2023-07-06 | End: 2023-07-13

## 2023-07-06 RX ORDER — AMOXICILLIN 500 MG/1
500 CAPSULE ORAL 2 TIMES DAILY
Qty: 20 CAPSULE | Refills: 0 | Status: CANCELLED | OUTPATIENT
Start: 2023-07-06 | End: 2023-07-16

## 2023-12-05 LAB
BASOPHILS ABSOLUTE: ABNORMAL
BASOPHILS RELATIVE PERCENT: ABNORMAL
BUN BLDV-MCNC: 34 MG/DL
CALCIUM SERPL-MCNC: 9 MG/DL
CHLORIDE BLD-SCNC: 96 MMOL/L
CO2: 22 MMOL/L
CREAT SERPL-MCNC: 2.2 MG/DL
EGFR: 30
EOSINOPHILS ABSOLUTE: ABNORMAL
EOSINOPHILS RELATIVE PERCENT: ABNORMAL
GLUCOSE BLD-MCNC: 372 MG/DL
HCT VFR BLD CALC: 30.6 % (ref 41–53)
HEMOGLOBIN: 10.2 G/DL (ref 13.5–17.5)
LYMPHOCYTES ABSOLUTE: ABNORMAL
LYMPHOCYTES RELATIVE PERCENT: ABNORMAL
MCH RBC QN AUTO: 35.4 PG
MCHC RBC AUTO-ENTMCNC: 33.3 G/DL
MCV RBC AUTO: 106.2 FL
MONOCYTES ABSOLUTE: ABNORMAL
MONOCYTES RELATIVE PERCENT: ABNORMAL
NEUTROPHILS ABSOLUTE: ABNORMAL
NEUTROPHILS RELATIVE PERCENT: ABNORMAL
PLATELET # BLD: 145 K/ΜL
PMV BLD AUTO: 12.3 FL
POTASSIUM SERPL-SCNC: 4.6 MMOL/L
RBC # BLD: 2.88 10^6/ΜL
SODIUM BLD-SCNC: 132 MMOL/L
WBC # BLD: 4.33 10^3/ML

## 2023-12-15 ENCOUNTER — TELEPHONE (OUTPATIENT)
Dept: NEPHROLOGY | Age: 83
End: 2023-12-15

## 2023-12-15 NOTE — TELEPHONE ENCOUNTER
Pt's son called stating that his dad is in bed won't get up, and is delusional. He stated that he thinks the pt has a UTI. He rescheduled his appointment today. I informed pt's son that if the pt is feeling that bad then he needs to go to ED. He wanted pt to get a UA done.

## 2023-12-19 LAB
BILIRUBIN, URINE: NEGATIVE
BLOOD, URINE: POSITIVE
CLARITY: CLEAR
COLOR: YELLOW
GLUCOSE URINE: >=1000
KETONES, URINE: NEGATIVE
LEUKOCYTE ESTERASE, URINE: NEGATIVE
NITRITE, URINE: NEGATIVE
PH UA: 5.5 (ref 4.5–8)
PROTEIN UA: NEGATIVE
PTH INTACT: 33.9
SPECIFIC GRAVITY, URINE: 1.02
UROBILINOGEN, URINE: NORMAL
VITAMIN D 25-HYDROXY: 37
VITAMIN D2, 25 HYDROXY: NORMAL
VITAMIN D3,25 HYDROXY: NORMAL

## 2024-02-02 DIAGNOSIS — N25.81 HYPERPARATHYROIDISM, SECONDARY RENAL (HCC): ICD-10-CM

## 2024-02-02 DIAGNOSIS — E11.21 DIABETIC NEPHROPATHY ASSOCIATED WITH TYPE 2 DIABETES MELLITUS (HCC): ICD-10-CM

## 2024-02-02 DIAGNOSIS — E55.9 VITAMIN D DEFICIENCY: ICD-10-CM

## 2024-02-02 DIAGNOSIS — I10 PRIMARY HYPERTENSION: ICD-10-CM

## 2024-02-02 DIAGNOSIS — N18.31 STAGE 3A CHRONIC KIDNEY DISEASE (HCC): Primary | ICD-10-CM

## 2024-02-08 PROBLEM — E87.5 ACUTE HYPERKALEMIA: Status: ACTIVE | Noted: 2023-05-10

## 2024-02-08 PROBLEM — I48.91 ATRIAL FIBRILLATION (HCC): Status: ACTIVE | Noted: 2023-12-04

## 2024-02-08 RX ORDER — DIVALPROEX SODIUM 125 MG/1
CAPSULE, COATED PELLETS ORAL
COMMUNITY
Start: 2024-01-19

## 2024-02-08 RX ORDER — METOPROLOL SUCCINATE 25 MG/1
25 TABLET, EXTENDED RELEASE ORAL DAILY
COMMUNITY
Start: 2023-12-07

## 2024-02-08 RX ORDER — FLUDROCORTISONE ACETATE 0.1 MG/1
TABLET ORAL
COMMUNITY
Start: 2024-01-19

## 2024-02-08 RX ORDER — ATENOLOL 25 MG/1
12.5 TABLET ORAL DAILY
COMMUNITY
Start: 2023-12-05 | End: 2024-02-09 | Stop reason: ALTCHOICE

## 2024-02-08 RX ORDER — QUETIAPINE FUMARATE 25 MG/1
25 TABLET, FILM COATED ORAL DAILY
COMMUNITY
Start: 2024-01-19

## 2024-02-08 RX ORDER — EMPAGLIFLOZIN 25 MG/1
25 TABLET, FILM COATED ORAL DAILY
COMMUNITY
Start: 2024-01-19

## 2024-02-09 ENCOUNTER — OFFICE VISIT (OUTPATIENT)
Dept: NEPHROLOGY | Age: 84
End: 2024-02-09
Payer: MEDICARE

## 2024-02-09 VITALS
WEIGHT: 162 LBS | OXYGEN SATURATION: 98 % | SYSTOLIC BLOOD PRESSURE: 104 MMHG | BODY MASS INDEX: 23.58 KG/M2 | HEART RATE: 74 BPM | DIASTOLIC BLOOD PRESSURE: 56 MMHG

## 2024-02-09 DIAGNOSIS — E55.9 VITAMIN D DEFICIENCY: ICD-10-CM

## 2024-02-09 DIAGNOSIS — E11.21 DIABETIC NEPHROPATHY ASSOCIATED WITH TYPE 2 DIABETES MELLITUS (HCC): ICD-10-CM

## 2024-02-09 DIAGNOSIS — I10 PRIMARY HYPERTENSION: ICD-10-CM

## 2024-02-09 DIAGNOSIS — I95.1 ORTHOSTATIC HYPOTENSION: ICD-10-CM

## 2024-02-09 DIAGNOSIS — E78.5 DYSLIPIDEMIA: ICD-10-CM

## 2024-02-09 DIAGNOSIS — N18.32 STAGE 3B CHRONIC KIDNEY DISEASE (HCC): Primary | ICD-10-CM

## 2024-02-09 PROCEDURE — 1123F ACP DISCUSS/DSCN MKR DOCD: CPT | Performed by: INTERNAL MEDICINE

## 2024-02-09 PROCEDURE — 3074F SYST BP LT 130 MM HG: CPT | Performed by: INTERNAL MEDICINE

## 2024-02-09 PROCEDURE — 99214 OFFICE O/P EST MOD 30 MIN: CPT | Performed by: INTERNAL MEDICINE

## 2024-02-09 PROCEDURE — 3078F DIAST BP <80 MM HG: CPT | Performed by: INTERNAL MEDICINE

## 2024-02-09 NOTE — PROGRESS NOTES
Renal Progress Note    Assessment and Plan:      Diagnosis Orders   1. Stage 3b chronic kidney disease (HCC)  Basic Metabolic Panel    Vitamin D 25 Hydroxy      2. Vitamin D deficiency  Vitamin D 25 Hydroxy      3. Diabetic nephropathy associated with type 2 diabetes mellitus (HCC)  Protein / creatinine ratio, urine      4. Dyslipidemia        5. Primary hypertension        6. Orthostatic hypotension                  PLAN:  I discussed my thoughts with patient and family.  They understood.  I addressed their questions.  Hospital records were reviewed from recent hospitalization.  Labs reviewed  Serum creatinine remains stable at 1.6 mg/dL  Vitamin D level is low at 21 declined from 37 before  Medications reviewed  Vitamin D3 over-the-counter 5000 international unit 1 tablet a day  Continue midodrine  Will also add fludrocortisone 0.1 mg 1 tablet a day for orthostatic hypotension  Return visit in 6 months with labs otherwise          Patient Active Problem List   Diagnosis    Chronic kidney disease    DM (diabetes mellitus) (HCC)    Hypertension    HLD (hyperlipidemia)    History of duodenal ulcer    Change in bowel habits    Bloating    Gastritis    Tubular adenoma of colon    Diverticulosis    Nephrolithiasis    Vitamin D deficiency    Hyperparathyroidism, secondary renal (HCC)    Abnormal cardiovascular stress test    Altered mental status    Pancytopenia (HCC)    Benign prostatic hyperplasia    Confusional state    Dizziness    Dyspnea on exertion    Moderate episode of recurrent major depressive disorder (HCC)    Normal pressure hydrocephalus (HCC)    Overweight with body mass index (BMI) 25.0-29.9    Pure hypercholesterolemia    Rectal hemorrhage    Tenesmus    Transient alteration of awareness    Type 2 diabetes mellitus with hyperglycemia (HCC)    Acute cystitis    Acute hyperkalemia    Atrial fibrillation (HCC)           Subjective:   Chief complaint:  Chief Complaint   Patient presents with    Follow-up

## 2024-08-05 LAB
BUN BLDV-MCNC: 23 MG/DL
CALCIUM SERPL-MCNC: 9.9 MG/DL
CHLORIDE BLD-SCNC: 97 MMOL/L
CO2: 33 MMOL/L
CREAT SERPL-MCNC: 1.5 MG/DL
EGFR: 45
ESTIMATED AVERAGE GLUCOSE: NORMAL
GLUCOSE BLD-MCNC: 233 MG/DL
HBA1C MFR BLD: 9.3 %
POTASSIUM SERPL-SCNC: 4.6 MMOL/L
SODIUM BLD-SCNC: 140 MMOL/L
VITAMIN D 25-HYDROXY: 95
VITAMIN D2, 25 HYDROXY: NORMAL
VITAMIN D3,25 HYDROXY: NORMAL

## 2024-08-15 RX ORDER — CYANOCOBALAMIN 1000 UG/ML
1000 INJECTION, SOLUTION INTRAMUSCULAR; SUBCUTANEOUS
COMMUNITY

## 2024-08-15 RX ORDER — HYDROXYZINE HYDROCHLORIDE 25 MG/1
25 TABLET, FILM COATED ORAL 3 TIMES DAILY PRN
COMMUNITY

## 2024-08-15 RX ORDER — SENNOSIDES A AND B 8.6 MG/1
1 TABLET, FILM COATED ORAL DAILY
COMMUNITY

## 2024-08-15 RX ORDER — CHOLECALCIFEROL (VITAMIN D3) 1250 MCG
CAPSULE ORAL
COMMUNITY